# Patient Record
Sex: MALE | Race: WHITE | NOT HISPANIC OR LATINO | Employment: OTHER | URBAN - METROPOLITAN AREA
[De-identification: names, ages, dates, MRNs, and addresses within clinical notes are randomized per-mention and may not be internally consistent; named-entity substitution may affect disease eponyms.]

---

## 2020-10-01 ENCOUNTER — OFFICE VISIT (OUTPATIENT)
Dept: CARDIOLOGY | Facility: CLINIC | Age: 50
End: 2020-10-01

## 2020-10-01 VITALS
WEIGHT: 192 LBS | HEART RATE: 68 BPM | BODY MASS INDEX: 30.13 KG/M2 | DIASTOLIC BLOOD PRESSURE: 120 MMHG | HEIGHT: 67 IN | SYSTOLIC BLOOD PRESSURE: 220 MMHG

## 2020-10-01 DIAGNOSIS — R07.2 PRECORDIAL PAIN: ICD-10-CM

## 2020-10-01 DIAGNOSIS — R06.02 SHORTNESS OF BREATH: ICD-10-CM

## 2020-10-01 DIAGNOSIS — I10 ESSENTIAL HYPERTENSION: Primary | ICD-10-CM

## 2020-10-01 PROCEDURE — 99204 OFFICE O/P NEW MOD 45 MIN: CPT | Performed by: INTERNAL MEDICINE

## 2020-10-01 PROCEDURE — 93000 ELECTROCARDIOGRAM COMPLETE: CPT | Performed by: INTERNAL MEDICINE

## 2020-10-01 RX ORDER — LISINOPRIL 10 MG/1
10 TABLET ORAL 2 TIMES DAILY
Qty: 90 TABLET | Refills: 3 | Status: SHIPPED | OUTPATIENT
Start: 2020-10-01 | End: 2020-11-11 | Stop reason: SDUPTHER

## 2020-10-01 RX ORDER — DILTIAZEM HYDROCHLORIDE 120 MG/1
120 CAPSULE, COATED, EXTENDED RELEASE ORAL DAILY
Qty: 90 CAPSULE | Refills: 1 | Status: SHIPPED | OUTPATIENT
Start: 2020-10-01 | End: 2020-10-14

## 2020-10-01 NOTE — PROGRESS NOTES
Date of Office Visit: 10/01/2020  Encounter Provider: Dr. Sudhakar Benito  Place of Service: [facility] CARDIOLOGY [place]  Patient Name: Joce Martinez  :1970  Sudhakar Benito MD    Chief Complaint   Patient presents with   • Hypertension  Shortness of breath  Chest pain     consult     History of Present Illness:    I am pleased to see Mr. Martinez in my office today as a new consultation.    As you know, patient is 49-year-old white gentleman whose past medical history is significant for hypertension who came today for symptom of chest pain and and syncope and shortness of breath.    Patient carries the diagnosis of hypertension but he has not been taking any medication.  He has not seen any physician for a while.  About 3 months ago patient had an episode of syncope in which he passed out without any prodromal symptom.  He did not seek any medical attention.  Patient reports symptom of chest pain.  Patient described this pain as a dull ache on the left side of the chest.  It is precipitated with exertion and relieved with rest.  Patient also complained of shortness of breath on exertion.  Patient denies any leg edema.    Patient does not abuse alcohol.  Patient does not abuse tobacco.  No previous history of CAD keep.    EKG showed normal sinus rhythm.  Nonspecific ST changes noted.    His blood pressure is very high.  Patient does not want to be admitted in the hospital.  After discussion, we agreed to proceed with Cardizem  mg daily.  Lisinopril 10 mg twice daily would be started.  I would also recommend to proceed with CBC and CMP.  I will see the patient in 6 weeks.  Echocardiogram would be done.  Patient would need stress test after control of hypertension.        Past Medical History:   Diagnosis Date   • Hypertension    • Seizures (CMS/HCC)          Past Surgical History:   Procedure Laterality Date   • FOOT SURGERY             Current Outpatient Medications:   •  dilTIAZem CD (Cardizem CD) 120 MG  "24 hr capsule, Take 1 capsule by mouth Daily., Disp: 90 capsule, Rfl: 1  •  lisinopril (PRINIVIL,ZESTRIL) 10 MG tablet, Take 1 tablet by mouth 2 (Two) Times a Day., Disp: 90 tablet, Rfl: 3      Social History     Socioeconomic History   • Marital status: Single     Spouse name: Not on file   • Number of children: Not on file   • Years of education: Not on file   • Highest education level: Not on file   Tobacco Use   • Smoking status: Never Smoker   • Smokeless tobacco: Never Used   Substance and Sexual Activity   • Alcohol use: Yes     Frequency: Never   • Drug use: Never   • Sexual activity: Defer         Review of Systems   Constitution: Negative for chills and fever.   HENT: Negative for ear discharge and nosebleeds.    Eyes: Negative for discharge and redness.   Cardiovascular: Positive for chest pain. Negative for orthopnea, palpitations, paroxysmal nocturnal dyspnea and syncope.   Respiratory: Positive for shortness of breath. Negative for cough and wheezing.    Endocrine: Negative for heat intolerance.   Skin: Negative for rash.   Musculoskeletal: Negative for arthritis and myalgias.   Gastrointestinal: Negative for abdominal pain, melena, nausea and vomiting.   Genitourinary: Negative for dysuria and hematuria.   Neurological: Negative for dizziness, light-headedness, numbness and tremors.   Psychiatric/Behavioral: Negative for depression. The patient is not nervous/anxious.        Procedures      ECG 12 Lead    Date/Time: 10/1/2020 1:48 PM  Performed by: Sudhakar Benito MD  Authorized by: Sudhakar Benito MD   Previous ECG: no previous ECG available  Rhythm: sinus rhythm  Other findings: non-specific ST-T wave changes    Clinical impression: normal ECG            ECG 12 Lead    (Results Pending)           Objective:    BP (!) 220/120   Pulse 68   Ht 170.2 cm (67\")   Wt 87.1 kg (192 lb)   BMI 30.07 kg/m²         Constitutional:       Appearance: Well-developed.   Eyes:      General: No scleral icterus.        " Right eye: No discharge.   HENT:      Head: Normocephalic and atraumatic.   Neck:      Thyroid: No thyromegaly.      Lymphadenopathy: No cervical adenopathy.   Pulmonary:      Effort: Pulmonary effort is normal. No respiratory distress.      Breath sounds: Normal breath sounds. No wheezing. No rales.   Cardiovascular:      Normal rate. Regular rhythm.      No gallop.   Abdominal:      Tenderness: There is no abdominal tenderness.   Skin:     Findings: No erythema or rash.   Neurological:      Mental Status: Alert and oriented to person, place, and time.             Assessment:       Diagnosis Plan   1. Essential hypertension  ECG 12 Lead    Adult Transthoracic Echo Complete W/ Cont if Necessary Per Protocol    dilTIAZem CD (Cardizem CD) 120 MG 24 hr capsule    lisinopril (PRINIVIL,ZESTRIL) 10 MG tablet    CBC (No Diff)    Comprehensive Metabolic Panel   2. Precordial pain  ECG 12 Lead    Adult Transthoracic Echo Complete W/ Cont if Necessary Per Protocol    dilTIAZem CD (Cardizem CD) 120 MG 24 hr capsule    lisinopril (PRINIVIL,ZESTRIL) 10 MG tablet    CBC (No Diff)    Comprehensive Metabolic Panel   3. Shortness of breath  ECG 12 Lead    Adult Transthoracic Echo Complete W/ Cont if Necessary Per Protocol    dilTIAZem CD (Cardizem CD) 120 MG 24 hr capsule    lisinopril (PRINIVIL,ZESTRIL) 10 MG tablet    CBC (No Diff)    Comprehensive Metabolic Panel            Plan:       At this stage, I would recommend that patient should proceed with echocardiogram.  I will start the patient on lisinopril and diltiazem.  CBC and CMP would be done.  Patient would need stress test in future

## 2020-10-07 ENCOUNTER — HOSPITAL ENCOUNTER (OUTPATIENT)
Dept: CARDIOLOGY | Facility: HOSPITAL | Age: 50
Discharge: HOME | End: 2020-10-07
Admitting: INTERNAL MEDICINE

## 2020-10-07 VITALS
DIASTOLIC BLOOD PRESSURE: 113 MMHG | SYSTOLIC BLOOD PRESSURE: 192 MMHG | HEIGHT: 67 IN | WEIGHT: 192 LBS | BODY MASS INDEX: 30.13 KG/M2

## 2020-10-07 DIAGNOSIS — R07.2 PRECORDIAL PAIN: ICD-10-CM

## 2020-10-07 DIAGNOSIS — I10 ESSENTIAL HYPERTENSION: ICD-10-CM

## 2020-10-07 DIAGNOSIS — R06.02 SHORTNESS OF BREATH: ICD-10-CM

## 2020-10-07 PROCEDURE — 93356 MYOCRD STRAIN IMG SPCKL TRCK: CPT | Performed by: INTERNAL MEDICINE

## 2020-10-07 PROCEDURE — 93356 MYOCRD STRAIN IMG SPCKL TRCK: CPT

## 2020-10-07 PROCEDURE — 93306 TTE W/DOPPLER COMPLETE: CPT

## 2020-10-07 PROCEDURE — 93306 TTE W/DOPPLER COMPLETE: CPT | Performed by: INTERNAL MEDICINE

## 2020-10-11 LAB
BH CV ECHO MEAS - ACS: 2.1 CM
BH CV ECHO MEAS - AI DEC SLOPE: 101 CM/SEC^2
BH CV ECHO MEAS - AI DEC TIME: 4.1 SEC
BH CV ECHO MEAS - AI MAX PG: 67.1 MMHG
BH CV ECHO MEAS - AI MAX VEL: 409.5 CM/SEC
BH CV ECHO MEAS - AI P1/2T: 1188 MSEC
BH CV ECHO MEAS - AO MAX PG (FULL): 2.7 MMHG
BH CV ECHO MEAS - AO MAX PG: 5.9 MMHG
BH CV ECHO MEAS - AO MEAN PG (FULL): 1.8 MMHG
BH CV ECHO MEAS - AO MEAN PG: 3.4 MMHG
BH CV ECHO MEAS - AO ROOT AREA (BSA CORRECTED): 1.6
BH CV ECHO MEAS - AO ROOT AREA: 8.3 CM^2
BH CV ECHO MEAS - AO ROOT DIAM: 3.2 CM
BH CV ECHO MEAS - AO V2 MAX: 121.5 CM/SEC
BH CV ECHO MEAS - AO V2 MEAN: 87.9 CM/SEC
BH CV ECHO MEAS - AO V2 VTI: 24 CM
BH CV ECHO MEAS - ASC AORTA: 3 CM
BH CV ECHO MEAS - AVA(I,A): 1.8 CM^2
BH CV ECHO MEAS - AVA(I,D): 1.8 CM^2
BH CV ECHO MEAS - AVA(V,A): 2.1 CM^2
BH CV ECHO MEAS - AVA(V,D): 2.1 CM^2
BH CV ECHO MEAS - BSA(HAYCOCK): 2.1 M^2
BH CV ECHO MEAS - BSA: 2 M^2
BH CV ECHO MEAS - BZI_BMI: 30.1 KILOGRAMS/M^2
BH CV ECHO MEAS - BZI_METRIC_HEIGHT: 170.2 CM
BH CV ECHO MEAS - BZI_METRIC_WEIGHT: 87.1 KG
BH CV ECHO MEAS - EDV(CUBED): 147.4 ML
BH CV ECHO MEAS - EDV(MOD-SP2): 63.7 ML
BH CV ECHO MEAS - EDV(MOD-SP4): 87.4 ML
BH CV ECHO MEAS - EDV(TEICH): 134.3 ML
BH CV ECHO MEAS - EF(CUBED): 56.7 %
BH CV ECHO MEAS - EF(MOD-SP2): 62.2 %
BH CV ECHO MEAS - EF(MOD-SP4): 61.5 %
BH CV ECHO MEAS - EF(TEICH): 48 %
BH CV ECHO MEAS - ESV(CUBED): 63.7 ML
BH CV ECHO MEAS - ESV(MOD-SP2): 24 ML
BH CV ECHO MEAS - ESV(MOD-SP4): 33.6 ML
BH CV ECHO MEAS - ESV(TEICH): 69.8 ML
BH CV ECHO MEAS - FS: 24.4 %
BH CV ECHO MEAS - IVS/LVPW: 1
BH CV ECHO MEAS - IVSD: 0.96 CM
BH CV ECHO MEAS - LA DIMENSION: 3.8 CM
BH CV ECHO MEAS - LA/AO: 1.2
BH CV ECHO MEAS - LV DIASTOLIC VOL/BSA (35-75): 44 ML/M^2
BH CV ECHO MEAS - LV IVRT: 0.11 SEC
BH CV ECHO MEAS - LV MASS(C)D: 182.8 GRAMS
BH CV ECHO MEAS - LV MASS(C)DI: 92 GRAMS/M^2
BH CV ECHO MEAS - LV MAX PG: 3.2 MMHG
BH CV ECHO MEAS - LV MEAN PG: 1.6 MMHG
BH CV ECHO MEAS - LV SYSTOLIC VOL/BSA (12-30): 16.9 ML/M^2
BH CV ECHO MEAS - LV V1 MAX: 89.7 CM/SEC
BH CV ECHO MEAS - LV V1 MEAN: 60 CM/SEC
BH CV ECHO MEAS - LV V1 VTI: 15.4 CM
BH CV ECHO MEAS - LVIDD: 5.3 CM
BH CV ECHO MEAS - LVIDS: 4 CM
BH CV ECHO MEAS - LVOT AREA: 2.9 CM^2
BH CV ECHO MEAS - LVOT DIAM: 1.9 CM
BH CV ECHO MEAS - LVPWD: 0.92 CM
BH CV ECHO MEAS - MV A MAX VEL: 93.9 CM/SEC
BH CV ECHO MEAS - MV DEC SLOPE: 386.9 CM/SEC^2
BH CV ECHO MEAS - MV DEC TIME: 0.22 SEC
BH CV ECHO MEAS - MV E MAX VEL: 84.7 CM/SEC
BH CV ECHO MEAS - MV E/A: 0.9
BH CV ECHO MEAS - MV MAX PG: 3.7 MMHG
BH CV ECHO MEAS - MV MEAN PG: 2 MMHG
BH CV ECHO MEAS - MV V2 MAX: 96.8 CM/SEC
BH CV ECHO MEAS - MV V2 MEAN: 69.1 CM/SEC
BH CV ECHO MEAS - MV V2 VTI: 22.7 CM
BH CV ECHO MEAS - MVA(VTI): 2 CM^2
BH CV ECHO MEAS - PA ACC TIME: 0.12 SEC
BH CV ECHO MEAS - PA MAX PG (FULL): 1.6 MMHG
BH CV ECHO MEAS - PA MAX PG: 4.6 MMHG
BH CV ECHO MEAS - PA PR(ACCEL): 22.9 MMHG
BH CV ECHO MEAS - PA V2 MAX: 107.7 CM/SEC
BH CV ECHO MEAS - PI END-D VEL: 123.5 CM/SEC
BH CV ECHO MEAS - PI MAX PG: 11 MMHG
BH CV ECHO MEAS - PI MAX VEL: 165.8 CM/SEC
BH CV ECHO MEAS - PULM A REVS DUR: 0.04 SEC
BH CV ECHO MEAS - PULM A REVS VEL: 29.6 CM/SEC
BH CV ECHO MEAS - PULM DIAS VEL: 44.4 CM/SEC
BH CV ECHO MEAS - PULM S/D: 1
BH CV ECHO MEAS - PULM SYS VEL: 46.4 CM/SEC
BH CV ECHO MEAS - PVA(V,A): 3 CM^2
BH CV ECHO MEAS - PVA(V,D): 3 CM^2
BH CV ECHO MEAS - QP/QS: 1.6
BH CV ECHO MEAS - RAP SYSTOLE: 3 MMHG
BH CV ECHO MEAS - RV MAX PG: 3 MMHG
BH CV ECHO MEAS - RV MEAN PG: 1.8 MMHG
BH CV ECHO MEAS - RV V1 MAX: 86.7 CM/SEC
BH CV ECHO MEAS - RV V1 MEAN: 63.6 CM/SEC
BH CV ECHO MEAS - RV V1 VTI: 18.7 CM
BH CV ECHO MEAS - RVDD: 3.5 CM
BH CV ECHO MEAS - RVOT AREA: 3.7 CM^2
BH CV ECHO MEAS - RVOT DIAM: 2.2 CM
BH CV ECHO MEAS - RVSP: 17.8 MMHG
BH CV ECHO MEAS - SI(AO): 99.9 ML/M^2
BH CV ECHO MEAS - SI(CUBED): 42.1 ML/M^2
BH CV ECHO MEAS - SI(LVOT): 22.3 ML/M^2
BH CV ECHO MEAS - SI(MOD-SP2): 19.9 ML/M^2
BH CV ECHO MEAS - SI(MOD-SP4): 27.1 ML/M^2
BH CV ECHO MEAS - SI(TEICH): 32.5 ML/M^2
BH CV ECHO MEAS - SV(AO): 198.6 ML
BH CV ECHO MEAS - SV(CUBED): 83.6 ML
BH CV ECHO MEAS - SV(LVOT): 44.4 ML
BH CV ECHO MEAS - SV(MOD-SP2): 39.6 ML
BH CV ECHO MEAS - SV(MOD-SP4): 53.8 ML
BH CV ECHO MEAS - SV(RVOT): 69.4 ML
BH CV ECHO MEAS - SV(TEICH): 64.5 ML
BH CV ECHO MEAS - TR MAX VEL: 192.2 CM/SEC
MAXIMAL PREDICTED HEART RATE: 171 BPM
STRESS TARGET HR: 145 BPM

## 2020-10-13 ENCOUNTER — APPOINTMENT (OUTPATIENT)
Dept: CARDIOLOGY | Facility: HOSPITAL | Age: 50
End: 2020-10-13

## 2020-10-13 ENCOUNTER — OFFICE VISIT (OUTPATIENT)
Dept: CARDIOLOGY | Facility: CLINIC | Age: 50
End: 2020-10-13

## 2020-10-13 ENCOUNTER — LAB (OUTPATIENT)
Dept: LAB | Facility: HOSPITAL | Age: 50
End: 2020-10-13

## 2020-10-13 VITALS
DIASTOLIC BLOOD PRESSURE: 104 MMHG | HEART RATE: 75 BPM | WEIGHT: 189 LBS | BODY MASS INDEX: 29.66 KG/M2 | HEIGHT: 67 IN | SYSTOLIC BLOOD PRESSURE: 160 MMHG

## 2020-10-13 DIAGNOSIS — R25.2 LEG CRAMPS: Primary | ICD-10-CM

## 2020-10-13 DIAGNOSIS — R06.02 SHORTNESS OF BREATH: ICD-10-CM

## 2020-10-13 DIAGNOSIS — R07.2 PRECORDIAL PAIN: ICD-10-CM

## 2020-10-13 DIAGNOSIS — R25.2 LEG CRAMPS: ICD-10-CM

## 2020-10-13 DIAGNOSIS — I10 ESSENTIAL HYPERTENSION: ICD-10-CM

## 2020-10-13 LAB
DEPRECATED RDW RBC AUTO: 40.4 FL (ref 37–54)
ERYTHROCYTE [DISTWIDTH] IN BLOOD BY AUTOMATED COUNT: 13 % (ref 12.3–15.4)
HCT VFR BLD AUTO: 49.5 % (ref 37.5–51)
HGB BLD-MCNC: 17.1 G/DL (ref 13–17.7)
MCH RBC QN AUTO: 30 PG (ref 26.6–33)
MCHC RBC AUTO-ENTMCNC: 34.5 G/DL (ref 31.5–35.7)
MCV RBC AUTO: 86.8 FL (ref 79–97)
PLATELET # BLD AUTO: 299 10*3/MM3 (ref 140–450)
PMV BLD AUTO: 10.4 FL (ref 6–12)
RBC # BLD AUTO: 5.7 10*6/MM3 (ref 4.14–5.8)
WBC # BLD AUTO: 7.59 10*3/MM3 (ref 3.4–10.8)

## 2020-10-13 PROCEDURE — 82550 ASSAY OF CK (CPK): CPT

## 2020-10-13 PROCEDURE — 93000 ELECTROCARDIOGRAM COMPLETE: CPT | Performed by: NURSE PRACTITIONER

## 2020-10-13 PROCEDURE — 83735 ASSAY OF MAGNESIUM: CPT

## 2020-10-13 PROCEDURE — 36415 COLL VENOUS BLD VENIPUNCTURE: CPT

## 2020-10-13 PROCEDURE — 85027 COMPLETE CBC AUTOMATED: CPT

## 2020-10-13 PROCEDURE — 99213 OFFICE O/P EST LOW 20 MIN: CPT | Performed by: NURSE PRACTITIONER

## 2020-10-13 PROCEDURE — 80053 COMPREHEN METABOLIC PANEL: CPT

## 2020-10-14 ENCOUNTER — HOSPITAL ENCOUNTER (OUTPATIENT)
Dept: CARDIOLOGY | Facility: HOSPITAL | Age: 50
Discharge: HOME | End: 2020-10-14
Admitting: NURSE PRACTITIONER

## 2020-10-14 DIAGNOSIS — R25.2 LEG CRAMPS: ICD-10-CM

## 2020-10-14 LAB
ALBUMIN SERPL-MCNC: 4.5 G/DL (ref 3.5–5.2)
ALBUMIN/GLOB SERPL: 1.7 G/DL
ALP SERPL-CCNC: 57 U/L (ref 39–117)
ALT SERPL W P-5'-P-CCNC: 13 U/L (ref 1–41)
ANION GAP SERPL CALCULATED.3IONS-SCNC: 6.9 MMOL/L (ref 5–15)
AST SERPL-CCNC: 21 U/L (ref 1–40)
BH CV LOWER VASCULAR LEFT COMMON FEMORAL AUGMENT: NORMAL
BH CV LOWER VASCULAR LEFT COMMON FEMORAL COMPETENT: NORMAL
BH CV LOWER VASCULAR LEFT COMMON FEMORAL COMPRESS: NORMAL
BH CV LOWER VASCULAR LEFT COMMON FEMORAL PHASIC: NORMAL
BH CV LOWER VASCULAR LEFT COMMON FEMORAL SPONT: NORMAL
BH CV LOWER VASCULAR LEFT DISTAL FEMORAL COMPRESS: NORMAL
BH CV LOWER VASCULAR LEFT GASTRONEMIUS COMPRESS: NORMAL
BH CV LOWER VASCULAR LEFT GREATER SAPH AK COMPRESS: NORMAL
BH CV LOWER VASCULAR LEFT GREATER SAPH BK COMPRESS: NORMAL
BH CV LOWER VASCULAR LEFT LESSER SAPH COMPRESS: NORMAL
BH CV LOWER VASCULAR LEFT MID FEMORAL AUGMENT: NORMAL
BH CV LOWER VASCULAR LEFT MID FEMORAL COMPETENT: NORMAL
BH CV LOWER VASCULAR LEFT MID FEMORAL COMPRESS: NORMAL
BH CV LOWER VASCULAR LEFT MID FEMORAL PHASIC: NORMAL
BH CV LOWER VASCULAR LEFT MID FEMORAL SPONT: NORMAL
BH CV LOWER VASCULAR LEFT PERONEAL COMPRESS: NORMAL
BH CV LOWER VASCULAR LEFT POPLITEAL AUGMENT: NORMAL
BH CV LOWER VASCULAR LEFT POPLITEAL COMPETENT: NORMAL
BH CV LOWER VASCULAR LEFT POPLITEAL COMPRESS: NORMAL
BH CV LOWER VASCULAR LEFT POPLITEAL PHASIC: NORMAL
BH CV LOWER VASCULAR LEFT POPLITEAL SPONT: NORMAL
BH CV LOWER VASCULAR LEFT POSTERIOR TIBIAL COMPRESS: NORMAL
BH CV LOWER VASCULAR LEFT PROXIMAL FEMORAL COMPRESS: NORMAL
BH CV LOWER VASCULAR LEFT SAPHENOFEMORAL JUNCTION COMPRESS: NORMAL
BH CV LOWER VASCULAR RIGHT COMMON FEMORAL AUGMENT: NORMAL
BH CV LOWER VASCULAR RIGHT COMMON FEMORAL COMPETENT: NORMAL
BH CV LOWER VASCULAR RIGHT COMMON FEMORAL COMPRESS: NORMAL
BH CV LOWER VASCULAR RIGHT COMMON FEMORAL PHASIC: NORMAL
BH CV LOWER VASCULAR RIGHT COMMON FEMORAL SPONT: NORMAL
BH CV LOWER VASCULAR RIGHT DISTAL FEMORAL COMPRESS: NORMAL
BH CV LOWER VASCULAR RIGHT GASTRONEMIUS COMPRESS: NORMAL
BH CV LOWER VASCULAR RIGHT GREATER SAPH AK COMPRESS: NORMAL
BH CV LOWER VASCULAR RIGHT GREATER SAPH BK COMPRESS: NORMAL
BH CV LOWER VASCULAR RIGHT LESSER SAPH COMPRESS: NORMAL
BH CV LOWER VASCULAR RIGHT MID FEMORAL AUGMENT: NORMAL
BH CV LOWER VASCULAR RIGHT MID FEMORAL COMPETENT: NORMAL
BH CV LOWER VASCULAR RIGHT MID FEMORAL COMPRESS: NORMAL
BH CV LOWER VASCULAR RIGHT MID FEMORAL PHASIC: NORMAL
BH CV LOWER VASCULAR RIGHT MID FEMORAL SPONT: NORMAL
BH CV LOWER VASCULAR RIGHT PERONEAL COMPRESS: NORMAL
BH CV LOWER VASCULAR RIGHT POPLITEAL AUGMENT: NORMAL
BH CV LOWER VASCULAR RIGHT POPLITEAL COMPETENT: NORMAL
BH CV LOWER VASCULAR RIGHT POPLITEAL COMPRESS: NORMAL
BH CV LOWER VASCULAR RIGHT POPLITEAL PHASIC: NORMAL
BH CV LOWER VASCULAR RIGHT POPLITEAL SPONT: NORMAL
BH CV LOWER VASCULAR RIGHT POSTERIOR TIBIAL COMPRESS: NORMAL
BH CV LOWER VASCULAR RIGHT PROXIMAL FEMORAL COMPRESS: NORMAL
BH CV LOWER VASCULAR RIGHT SAPHENOFEMORAL JUNCTION COMPRESS: NORMAL
BILIRUB SERPL-MCNC: 0.6 MG/DL (ref 0–1.2)
BUN SERPL-MCNC: 15 MG/DL (ref 6–20)
BUN/CREAT SERPL: 12.1 (ref 7–25)
CALCIUM SPEC-SCNC: 10.2 MG/DL (ref 8.6–10.5)
CHLORIDE SERPL-SCNC: 103 MMOL/L (ref 98–107)
CK SERPL-CCNC: 87 U/L (ref 20–200)
CO2 SERPL-SCNC: 28.1 MMOL/L (ref 22–29)
CREAT SERPL-MCNC: 1.24 MG/DL (ref 0.76–1.27)
GFR SERPL CREATININE-BSD FRML MDRD: 62 ML/MIN/1.73
GLOBULIN UR ELPH-MCNC: 2.7 GM/DL
GLUCOSE SERPL-MCNC: 75 MG/DL (ref 65–99)
MAGNESIUM SERPL-MCNC: 2.4 MG/DL (ref 1.6–2.6)
POTASSIUM SERPL-SCNC: 4.2 MMOL/L (ref 3.5–5.2)
PROT SERPL-MCNC: 7.2 G/DL (ref 6–8.5)
SODIUM SERPL-SCNC: 138 MMOL/L (ref 136–145)

## 2020-10-14 PROCEDURE — 93970 EXTREMITY STUDY: CPT

## 2020-10-14 RX ORDER — DILTIAZEM HYDROCHLORIDE 240 MG/1
240 CAPSULE, COATED, EXTENDED RELEASE ORAL DAILY
Qty: 30 CAPSULE | Refills: 5 | Status: SHIPPED | OUTPATIENT
Start: 2020-10-14 | End: 2021-02-22 | Stop reason: SDUPTHER

## 2020-10-14 NOTE — PROGRESS NOTES
Call pt  Ultrasounds are negative  There is no blood clot  Increase cardizem as per earlier instructions  Continue ibuprofen prn  If persistant leg cramps can see PCP

## 2020-10-14 NOTE — PROGRESS NOTES
Call pt and let him know all his labs look normal  Tell him I am going to talk to dr. nicole about him today and will get back to him about his recommendations

## 2020-10-19 PROBLEM — R25.2 LEG CRAMPS: Status: ACTIVE | Noted: 2020-10-19

## 2020-10-19 NOTE — PROGRESS NOTES
Cardiology Office Follow Up Visit      Primary Care Provider:  Sudhakar Benito MD    Reason for f/u:     Leg cramps  Elevated blood pressure      Subjective     CC:    Complains of left lower extremity leg cramps    History of Present Illness       Joce Martinez is a 49 y.o. male.  He comes today for earlier than scheduled follow-up due to complaints of left lower extremity leg cramps.  The patient was recently seen by Dr. Benito on October 1.  He has hypertension and episodes of chest pain and shortness of breath and a brief syncopal episode.    The patient has not previously been treated for hypertension.  He was started on Cardizem  mg daily and lisinopril 10 mg twice daily by Dr. Benito.    He returns today for earlier than scheduled follow-up due to complaints of left lower extremity leg cramps worse at night but at times happening during the day as well.  Sometimes in the right leg also but mostly in the left.    He denies any overt pain with walking but says the muscle feels sore after cramps.    On physical exam he has strong bounding pulses in both lower extremities.  There is no redness or pallor of the legs.  Temperature is warm.  He reports compliance with medical therapy.        Past Medical History:   Diagnosis Date   • Hypertension    • Seizures (CMS/HCC)        Past Surgical History:   Procedure Laterality Date   • FOOT SURGERY           Current Outpatient Medications:   •  dilTIAZem CD (CARDIZEM CD) 240 MG 24 hr capsule, Take 1 capsule by mouth Daily., Disp: 30 capsule, Rfl: 5  •  lisinopril (PRINIVIL,ZESTRIL) 10 MG tablet, Take 1 tablet by mouth 2 (Two) Times a Day., Disp: 90 tablet, Rfl: 3    Social History     Socioeconomic History   • Marital status: Single     Spouse name: Not on file   • Number of children: Not on file   • Years of education: Not on file   • Highest education level: Not on file   Tobacco Use   • Smoking status: Never Smoker   • Smokeless tobacco: Never Used   Substance and  "Sexual Activity   • Alcohol use: Yes     Frequency: Never   • Drug use: Never   • Sexual activity: Defer       Family History   Problem Relation Age of Onset   • Heart disease Mother        The following portions of the patient's history were reviewed and updated as appropriate: allergies, current medications, past family history, past medical history, past social history, past surgical history and problem list.    Review of Systems   Constitution: Negative for decreased appetite and diaphoresis.   HENT: Negative for congestion, hearing loss and nosebleeds.    Cardiovascular: Negative for chest pain, claudication, dyspnea on exertion, irregular heartbeat, leg swelling, near-syncope, orthopnea, palpitations, paroxysmal nocturnal dyspnea and syncope.   Respiratory: Negative for cough, shortness of breath and sleep disturbances due to breathing.    Endocrine: Negative for polyuria.   Hematologic/Lymphatic: Does not bruise/bleed easily.   Skin: Negative for itching and rash.   Musculoskeletal: Positive for muscle cramps. Negative for back pain, muscle weakness and myalgias.   Gastrointestinal: Negative for abdominal pain, change in bowel habit and nausea.   Genitourinary: Negative for dysuria, flank pain, frequency and hesitancy.   Neurological: Negative for dizziness, tremors and weakness.   Psychiatric/Behavioral: Negative for altered mental status. The patient does not have insomnia.      BP (!) 160/104   Pulse 75   Ht 170.2 cm (67.01\")   Wt 85.7 kg (189 lb)   BMI 29.59 kg/m² .  Objective     Constitutional:       General: Not in acute distress.     Appearance: Normal appearance. Well-developed.   Eyes:      Pupils: Pupils are equal, round, and reactive to light.   HENT:      Head: Normocephalic and atraumatic.   Neck:      Musculoskeletal: Normal range of motion and neck supple.      Vascular: No JVD.   Pulmonary:      Effort: Pulmonary effort is normal.      Breath sounds: Normal breath sounds. "   Cardiovascular:      Normal rate. Regular rhythm.   Pulses:     Intact distal pulses.   Edema:     Peripheral edema absent.   Abdominal:      General: There is no distension.      Palpations: Abdomen is soft.      Tenderness: There is no abdominal tenderness.   Musculoskeletal: Normal range of motion.   Skin:     General: Skin is warm and dry.   Neurological:      Mental Status: Alert and oriented to person, place, and time.             ECG 12 Lead    Date/Time: 10/13/2020 2:44 PM  Performed by: Deidra Suarez APRN  Authorized by: Deidra Suarez APRN   Comparison: not compared with previous ECG   Rhythm: sinus rhythm  BPM: 75  Other findings: non-specific ST-T wave changes    Clinical impression: non-specific ECG                     Diagnoses and all orders for this visit:    1. Leg cramps (Primary)  Comments:  Complains of severe leg cramping left lower extremity extremity and at times right lower extremity to lesser degree      Orders:  -     ECG 12 Lead  -     Magnesium; Future  -     Comprehensive Metabolic Panel; Future  -     CK; Future  -     Cancel: Duplex Venous Lower Extremity - Bilateral CAR; Future  -     Duplex Venous Lower Extremity - Bilateral CAR; Future    2. Essential hypertension  Comments:  Elevated.  Reviewed home blood pressure log and blood pressure in office today 160/104  Orders:  -     dilTIAZem CD (CARDIZEM CD) 240 MG 24 hr capsule; Take 1 capsule by mouth Daily.  Dispense: 30 capsule; Refill: 5          :  At this time would like to get some baseline labs including a BMP, magnesium level and total CK.    The patient does not have any erythema or warmth in either calf or thigh.  Pulses are intact.  Will review labs and make further recommendations at that time.  Would consider also uptitrating his antihypertensive agents as his blood pressure still not well controlled but will await labs first.    Also would consider a venous Doppler bilateral lower extremities if labs are  unremarkable

## 2020-11-11 ENCOUNTER — OFFICE VISIT (OUTPATIENT)
Dept: CARDIOLOGY | Facility: CLINIC | Age: 50
End: 2020-11-11

## 2020-11-11 VITALS
HEIGHT: 67 IN | DIASTOLIC BLOOD PRESSURE: 100 MMHG | BODY MASS INDEX: 30.29 KG/M2 | HEART RATE: 94 BPM | WEIGHT: 193 LBS | SYSTOLIC BLOOD PRESSURE: 164 MMHG | OXYGEN SATURATION: 98 %

## 2020-11-11 DIAGNOSIS — R07.2 PRECORDIAL PAIN: ICD-10-CM

## 2020-11-11 DIAGNOSIS — R06.02 SHORTNESS OF BREATH: ICD-10-CM

## 2020-11-11 DIAGNOSIS — I10 ESSENTIAL HYPERTENSION: Primary | ICD-10-CM

## 2020-11-11 PROCEDURE — 99214 OFFICE O/P EST MOD 30 MIN: CPT | Performed by: INTERNAL MEDICINE

## 2020-11-11 RX ORDER — LISINOPRIL 20 MG/1
20 TABLET ORAL 2 TIMES DAILY
Qty: 120 TABLET | Refills: 1 | Status: SHIPPED | OUTPATIENT
Start: 2020-11-11

## 2020-11-11 NOTE — PROGRESS NOTES
Date of Office Visit: 2020  Encounter Provider: Dr. Sudhakar Benito    Place of Service: [facility] CARDIOLOGY [place]  Patient Name: Joce Martinez  :1970  Sudhakar Benito MD    Chief Complaint   Patient presents with   • Follow-up     Echo      History of Present Illness    I am pleased to see Mr. Martinez in my office today came today for follow-up.    As you know, patient is 50-year-old white gentleman whose past medical history is significant for hypertension who came today for follow-up.    In 2020, patient was referred to me for symptom of chest pain and shortness of breath.  His blood pressure was significantly elevated.  Patient was started on lisinopril 10 mg twice daily and Cardizem  mg daily.  Echocardiogram was recommended which showed EF of 55 to 60% and no significant valvular heart disease noted.  His blood pressure was elevated on previous visit Cardizem was increased to 240.  And BMP showed creatinine of 1.2 and potassium of 4.2.    Patient came today and his blood pressure continues to be high.  Patient reports that he is compliant with his medication.  Patient denies any orthopnea or PND.  Patient does complain of occasional heaviness.  Patient denies any orthopnea or PND.  No leg edema noted.    At this stage I would recommend to increase lisinopril to 20 mg twice daily.  I would refer the patient to Dr. higginbotham for PCP.  I would consider stress test once blood pressure is controlled.        Past Medical History:   Diagnosis Date   • Hypertension    • Seizures (CMS/HCC)          Past Surgical History:   Procedure Laterality Date   • FOOT SURGERY             Current Outpatient Medications:   •  dilTIAZem CD (CARDIZEM CD) 240 MG 24 hr capsule, Take 1 capsule by mouth Daily., Disp: 30 capsule, Rfl: 5  •  lisinopril (PRINIVIL,ZESTRIL) 20 MG tablet, Take 1 tablet by mouth 2 (Two) Times a Day., Disp: 120 tablet, Rfl: 1      Social History     Socioeconomic History   • Marital  "status: Single     Spouse name: Not on file   • Number of children: Not on file   • Years of education: Not on file   • Highest education level: Not on file   Tobacco Use   • Smoking status: Never Smoker   • Smokeless tobacco: Never Used   Substance and Sexual Activity   • Alcohol use: Yes     Frequency: Never   • Drug use: Never   • Sexual activity: Defer         Review of Systems   Constitution: Negative for chills and fever.   HENT: Negative for ear discharge and nosebleeds.    Eyes: Negative for discharge and redness.   Cardiovascular: Positive for chest pain. Negative for orthopnea, palpitations, paroxysmal nocturnal dyspnea and syncope.   Respiratory: Negative for cough, shortness of breath and wheezing.    Endocrine: Negative for heat intolerance.   Skin: Negative for rash.   Musculoskeletal: Negative for arthritis and myalgias.   Gastrointestinal: Negative for abdominal pain, melena, nausea and vomiting.   Genitourinary: Negative for dysuria and hematuria.   Neurological: Negative for dizziness, light-headedness, numbness and tremors.   Psychiatric/Behavioral: Negative for depression. The patient is not nervous/anxious.        Procedures    Procedures    No orders to display           Objective:    /100   Pulse 94   Ht 170.2 cm (67.01\")   Wt 87.5 kg (193 lb)   SpO2 98%   BMI 30.22 kg/m²         Constitutional:       Appearance: Well-developed.   Eyes:      General: No scleral icterus.        Right eye: No discharge.   HENT:      Head: Normocephalic and atraumatic.   Neck:      Thyroid: No thyromegaly.      Lymphadenopathy: No cervical adenopathy.   Pulmonary:      Effort: Pulmonary effort is normal. No respiratory distress.      Breath sounds: Normal breath sounds. No wheezing. No rales.   Cardiovascular:      Normal rate. Regular rhythm.      No gallop.   Abdominal:      Tenderness: There is no abdominal tenderness.   Skin:     Findings: No erythema or rash.   Neurological:      Mental Status: " Alert and oriented to person, place, and time.             Assessment:       Diagnosis Plan   1. Essential hypertension  lisinopril (PRINIVIL,ZESTRIL) 20 MG tablet   2. Precordial pain  lisinopril (PRINIVIL,ZESTRIL) 20 MG tablet   3. Shortness of breath  lisinopril (PRINIVIL,ZESTRIL) 20 MG tablet            Plan:       At this stage, I would recommend that patient should proceed with current treatment.  I would increase lisinopril to 20 mg twice daily.  Blood pressure monitoring is recommended.  I will see the patient in 2 months.  Consider stress test at that time.  Patient is advised to call me and come to the hospital if there is worsening of chest pain

## 2021-02-22 ENCOUNTER — OFFICE VISIT (OUTPATIENT)
Dept: CARDIOLOGY | Facility: CLINIC | Age: 51
End: 2021-02-22

## 2021-02-22 VITALS
HEART RATE: 70 BPM | HEIGHT: 67 IN | WEIGHT: 197 LBS | BODY MASS INDEX: 30.92 KG/M2 | SYSTOLIC BLOOD PRESSURE: 202 MMHG | DIASTOLIC BLOOD PRESSURE: 122 MMHG

## 2021-02-22 DIAGNOSIS — I10 ESSENTIAL HYPERTENSION: Primary | ICD-10-CM

## 2021-02-22 DIAGNOSIS — I10 ESSENTIAL HYPERTENSION: ICD-10-CM

## 2021-02-22 PROCEDURE — 99213 OFFICE O/P EST LOW 20 MIN: CPT | Performed by: INTERNAL MEDICINE

## 2021-02-22 RX ORDER — DILTIAZEM HYDROCHLORIDE 240 MG/1
240 CAPSULE, COATED, EXTENDED RELEASE ORAL DAILY
Qty: 30 CAPSULE | Refills: 5 | Status: SHIPPED | OUTPATIENT
Start: 2021-02-22

## 2021-02-22 RX ORDER — HYDROCHLOROTHIAZIDE 25 MG/1
25 TABLET ORAL DAILY
Qty: 90 TABLET | Refills: 1 | Status: SHIPPED | OUTPATIENT
Start: 2021-02-22

## 2021-02-22 NOTE — PROGRESS NOTES
Date of Office Visit: 2021  Encounter Provider: Dr. Sudhakar Benito  Place of Service: [facility] CARDIOLOGY [place]  Patient Name: Joce Martinez  :1970  Sudhakar Benito MD    Chief Complaint   Patient presents with   • Hypertension     4 month follow up     History of Present Illness    I am pleased to see Mr. Martinez in my office today came today for follow-up.    As you know, patient is 50-year-old white gentleman whose past medical history is significant for hypertension who came today for follow-up.    In 2020, patient was referred to me for symptom of chest pain and shortness of breath.  His blood pressure was significantly elevated.  Patient was started on lisinopril 10 mg twice daily and Cardizem  mg daily.  Echocardiogram was recommended which showed EF of 55 to 60% and no significant valvular heart disease noted.  His blood pressure was elevated on previous visit Cardizem was increased to 240.  And BMP showed creatinine of 1.2 and potassium of 4.2.    Unfortunately, patient ran out of the medications and his blood pressure is very high today.  Patient is without hydrochlorothiazide and and diltiazem.  Patient denies any chest pain.  Patient denies any orthopnea PND no shortness of breath.  No orthopnea or PND no syncope or presyncope.  No leg edema noted.    At this stage, I would recommend to start Lopressor 25 mg twice daily.  I would give him refills for hydrochlorothiazide and Cardizem CD.  Patient is advised to be compliant with the medication.  I spent significant time with the patient.      Past Medical History:   Diagnosis Date   • Hypertension    • Seizures (CMS/HCC)          Past Surgical History:   Procedure Laterality Date   • FOOT SURGERY             Current Outpatient Medications:   •  dilTIAZem CD (CARDIZEM CD) 240 MG 24 hr capsule, Take 1 capsule by mouth Daily., Disp: 30 capsule, Rfl: 5  •  hydroCHLOROthiazide (HYDRODIURIL) 25 MG tablet, Take 1 tablet by mouth  "Daily., Disp: 90 tablet, Rfl: 1  •  lisinopril (PRINIVIL,ZESTRIL) 20 MG tablet, Take 1 tablet by mouth 2 (Two) Times a Day., Disp: 120 tablet, Rfl: 1  •  metoprolol tartrate (LOPRESSOR) 25 MG tablet, Take 1 tablet by mouth 2 (Two) Times a Day., Disp: 180 tablet, Rfl: 3      Social History     Socioeconomic History   • Marital status: Single     Spouse name: Not on file   • Number of children: Not on file   • Years of education: Not on file   • Highest education level: Not on file   Tobacco Use   • Smoking status: Never Smoker   • Smokeless tobacco: Never Used   Substance and Sexual Activity   • Alcohol use: Yes     Frequency: Never   • Drug use: Never   • Sexual activity: Defer         Review of Systems   Constitution: Negative for chills and fever.   HENT: Negative for ear discharge and nosebleeds.    Eyes: Negative for discharge and redness.   Cardiovascular: Negative for chest pain, orthopnea, palpitations, paroxysmal nocturnal dyspnea and syncope.   Respiratory: Negative for cough, shortness of breath and wheezing.    Endocrine: Negative for heat intolerance.   Skin: Negative for rash.   Musculoskeletal: Negative for arthritis and myalgias.   Gastrointestinal: Negative for abdominal pain, melena, nausea and vomiting.   Genitourinary: Negative for dysuria and hematuria.   Neurological: Negative for dizziness, light-headedness, numbness and tremors.   Psychiatric/Behavioral: Negative for depression. The patient is not nervous/anxious.        Procedures    Procedures    No orders to display           Objective:    BP (!) 202/122   Pulse 70   Ht 170.2 cm (67.01\")   Wt 89.4 kg (197 lb)   BMI 30.85 kg/m²         Constitutional:       Appearance: Well-developed.   Eyes:      General: No scleral icterus.        Right eye: No discharge.   HENT:      Head: Normocephalic and atraumatic.   Neck:      Thyroid: No thyromegaly.      Lymphadenopathy: No cervical adenopathy.   Pulmonary:      Effort: Pulmonary effort is " normal. No respiratory distress.      Breath sounds: Normal breath sounds. No wheezing. No rales.   Cardiovascular:      Normal rate. Regular rhythm.      No gallop.   Abdominal:      Tenderness: There is no abdominal tenderness.   Skin:     Findings: No erythema or rash.   Neurological:      Mental Status: Alert and oriented to person, place, and time.             Assessment:       Diagnosis Plan   1. Essential hypertension  dilTIAZem CD (CARDIZEM CD) 240 MG 24 hr capsule    hydroCHLOROthiazide (HYDRODIURIL) 25 MG tablet    metoprolol tartrate (LOPRESSOR) 25 MG tablet   2. Essential hypertension  dilTIAZem CD (CARDIZEM CD) 240 MG 24 hr capsule    hydroCHLOROthiazide (HYDRODIURIL) 25 MG tablet    metoprolol tartrate (LOPRESSOR) 25 MG tablet    Elevated.  Reviewed home blood pressure log and blood pressure in office today 160/104            Plan:       MDM:    1.  Hypertension:    Unfortunately patient ran out of hydrochlorothiazide and Cardizem CD.  I would recommend to proceed with current treatment.  I would add Lopressor 25 mg twice daily.  I would give the refills for hydrochlorothiazide and diltiazem CD.  Patient is advised to monitor the blood pressure.  I will see the patient in 6 weeks